# Patient Record
Sex: FEMALE | Race: WHITE | NOT HISPANIC OR LATINO | Employment: FULL TIME | URBAN - METROPOLITAN AREA
[De-identification: names, ages, dates, MRNs, and addresses within clinical notes are randomized per-mention and may not be internally consistent; named-entity substitution may affect disease eponyms.]

---

## 2024-06-05 ENCOUNTER — HOSPITAL ENCOUNTER (EMERGENCY)
Facility: MEDICAL CENTER | Age: 39
End: 2024-06-05
Attending: EMERGENCY MEDICINE
Payer: COMMERCIAL

## 2024-06-05 ENCOUNTER — APPOINTMENT (OUTPATIENT)
Dept: RADIOLOGY | Facility: MEDICAL CENTER | Age: 39
End: 2024-06-05
Attending: EMERGENCY MEDICINE
Payer: COMMERCIAL

## 2024-06-05 ENCOUNTER — OFFICE VISIT (OUTPATIENT)
Dept: URGENT CARE | Facility: PHYSICIAN GROUP | Age: 39
End: 2024-06-05
Payer: COMMERCIAL

## 2024-06-05 VITALS
HEART RATE: 78 BPM | WEIGHT: 177 LBS | TEMPERATURE: 98.2 F | HEIGHT: 63 IN | BODY MASS INDEX: 31.36 KG/M2 | DIASTOLIC BLOOD PRESSURE: 88 MMHG | OXYGEN SATURATION: 98 % | SYSTOLIC BLOOD PRESSURE: 128 MMHG | RESPIRATION RATE: 18 BRPM

## 2024-06-05 VITALS
WEIGHT: 177.03 LBS | RESPIRATION RATE: 17 BRPM | HEART RATE: 70 BPM | TEMPERATURE: 98.1 F | SYSTOLIC BLOOD PRESSURE: 138 MMHG | OXYGEN SATURATION: 98 % | DIASTOLIC BLOOD PRESSURE: 77 MMHG | BODY MASS INDEX: 31.37 KG/M2 | HEIGHT: 63 IN

## 2024-06-05 DIAGNOSIS — R51.9 NONINTRACTABLE HEADACHE, UNSPECIFIED CHRONICITY PATTERN, UNSPECIFIED HEADACHE TYPE: ICD-10-CM

## 2024-06-05 DIAGNOSIS — R51.9 ACUTE INTRACTABLE HEADACHE, UNSPECIFIED HEADACHE TYPE: ICD-10-CM

## 2024-06-05 PROCEDURE — 3074F SYST BP LT 130 MM HG: CPT | Performed by: NURSE PRACTITIONER

## 2024-06-05 PROCEDURE — 700111 HCHG RX REV CODE 636 W/ 250 OVERRIDE (IP): Mod: JZ | Performed by: EMERGENCY MEDICINE

## 2024-06-05 PROCEDURE — 700117 HCHG RX CONTRAST REV CODE 255: Performed by: EMERGENCY MEDICINE

## 2024-06-05 PROCEDURE — 99284 EMERGENCY DEPT VISIT MOD MDM: CPT

## 2024-06-05 PROCEDURE — 99204 OFFICE O/P NEW MOD 45 MIN: CPT | Performed by: NURSE PRACTITIONER

## 2024-06-05 PROCEDURE — 96365 THER/PROPH/DIAG IV INF INIT: CPT

## 2024-06-05 PROCEDURE — 70496 CT ANGIOGRAPHY HEAD: CPT

## 2024-06-05 PROCEDURE — 3079F DIAST BP 80-89 MM HG: CPT | Performed by: NURSE PRACTITIONER

## 2024-06-05 PROCEDURE — 96375 TX/PRO/DX INJ NEW DRUG ADDON: CPT

## 2024-06-05 RX ORDER — ACETAMINOPHEN 10 MG/ML
1000 INJECTION, SOLUTION INTRAVENOUS ONCE
Status: COMPLETED | OUTPATIENT
Start: 2024-06-05 | End: 2024-06-05

## 2024-06-05 RX ORDER — NORETHINDRONE ACETATE AND ETHINYL ESTRADIOL 1.5; .03 MG/1; MG/1
TABLET ORAL
COMMUNITY
Start: 2024-05-02

## 2024-06-05 RX ORDER — METOCLOPRAMIDE HYDROCHLORIDE 5 MG/ML
10 INJECTION INTRAMUSCULAR; INTRAVENOUS ONCE
Status: COMPLETED | OUTPATIENT
Start: 2024-06-05 | End: 2024-06-05

## 2024-06-05 RX ORDER — DIPHENHYDRAMINE HYDROCHLORIDE 50 MG/ML
25 INJECTION INTRAMUSCULAR; INTRAVENOUS ONCE
Status: COMPLETED | OUTPATIENT
Start: 2024-06-05 | End: 2024-06-05

## 2024-06-05 RX ORDER — CETIRIZINE HYDROCHLORIDE 10 MG/1
1 TABLET ORAL DAILY
COMMUNITY

## 2024-06-05 RX ORDER — FLUTICASONE PROPIONATE 50 MCG
1 SPRAY, SUSPENSION (ML) NASAL 2 TIMES DAILY
COMMUNITY

## 2024-06-05 RX ADMIN — IOHEXOL 80 ML: 350 INJECTION, SOLUTION INTRAVENOUS at 15:04

## 2024-06-05 RX ADMIN — ACETAMINOPHEN 1000 MG: 1000 INJECTION INTRAVENOUS at 13:33

## 2024-06-05 RX ADMIN — METOCLOPRAMIDE 10 MG: 5 INJECTION, SOLUTION INTRAMUSCULAR; INTRAVENOUS at 13:30

## 2024-06-05 RX ADMIN — DIPHENHYDRAMINE HYDROCHLORIDE 25 MG: 50 INJECTION, SOLUTION INTRAMUSCULAR; INTRAVENOUS at 13:30

## 2024-06-05 ASSESSMENT — VISUAL ACUITY: OU: 1

## 2024-06-05 ASSESSMENT — ENCOUNTER SYMPTOMS
CHILLS: 0
SHORTNESS OF BREATH: 0
BLURRED VISION: 0
FEVER: 0
WEAKNESS: 0
HEADACHES: 1
CONSTITUTIONAL NEGATIVE: 1
SENSORY CHANGE: 0
VOMITING: 0
CARDIOVASCULAR NEGATIVE: 1
NAUSEA: 0

## 2024-06-05 NOTE — ED TRIAGE NOTES
Cecy Olivares  39 y.o.  female  Chief Complaint   Patient presents with    Headache     Pt reports she had a severe 9/10 headache that started suddenly last night during intercourse at 9PM, pt reports it was sudden onset, lasted about 5 minutes. Has been dull at 5/10 since, to top of head, no nausea or light sensitivity, no neuro deficits. Sent from [place] for imaging.      Patient educated on triage process, to alert staff if any changes in condition.

## 2024-06-05 NOTE — DISCHARGE INSTRUCTIONS
You likely have a postcoital headache, thankfully your CTs were unremarkable.  You can take Excedrin for your headache.  Follow-up with your primary care physician.  Discussed changing your oral birth control as this may be contributing

## 2024-06-05 NOTE — ED NOTES
Pt is discharged. VSS. Paperwork explained to pt by ERP and all questions answered. All belongings sent with pt upon departure. Pt ambulatory with a steady gait out of ED.

## 2024-06-05 NOTE — PROGRESS NOTES
Subjective:     Cecy Olivares is a 39 y.o. female who presents for Headache (Severe and sudden onset of a headache last night. Is better this morning, but is still there. )       Headache  Location:  Back of head    Patient reports at 9 PM last night, she was having sex when she developed sudden onset of severe posterior headache.  Reports it was 9/10.  She was not able to do anything for about 10 minutes due to the severe pain.  Eventually subsided.  However, pain has still been moderate ever since.    Denies previous history of chronic headache or migraines.  Denies head injury.    Reports mother does have a history of a brain tumor presumed to be benign.    Denies fever, chest pain, shortness of breath, vision change, nausea, vomiting, sensory changes, weakness, or other symptoms.    Has not taken medication.    Review of Systems   Constitutional: Negative.  Negative for chills, fever and malaise/fatigue.   Eyes:  Negative for blurred vision.   Respiratory:  Negative for shortness of breath.    Cardiovascular: Negative.  Negative for chest pain.   Gastrointestinal:  Negative for nausea and vomiting.   Neurological:  Positive for headaches. Negative for sensory change and weakness.   All other systems reviewed and are negative.    Refer to HPI for additional details.    During this visit, appropriate PPE was worn, and hand hygiene was performed.    PMH:  has no past medical history on file.    MEDS:   Current Outpatient Medications:     cetirizine (ZYRTEC) 10 MG Tab, Take 1 Tablet by mouth every day., Disp: , Rfl:     fluticasone (FLONASE ALLERGY RELIEF) 50 MCG/ACT nasal spray, Administer 1 Spray into affected nostril(S) 2 times a day., Disp: , Rfl:     TAMIE 1.5/30 1.5-30 MG-MCG Tab, , Disp: , Rfl:     ALLERGIES: No Known Allergies  SURGHX: History reviewed. No pertinent surgical history.  SOCHX:  reports that she has never smoked. She has never used smokeless tobacco. She reports current alcohol use.  "She reports that she does not use drugs.    FH: Per HPI as applicable/pertinent.      Objective:     /88 (BP Location: Left arm, Patient Position: Sitting, BP Cuff Size: Adult)   Pulse 78   Temp 36.8 °C (98.2 °F) (Temporal)   Resp 18   Ht 1.6 m (5' 3\")   Wt 80.3 kg (177 lb)   SpO2 98%   BMI 31.35 kg/m²     Physical Exam  Nursing note reviewed.   Constitutional:       General: She is not in acute distress.     Appearance: She is well-developed. She is not ill-appearing or toxic-appearing.   Eyes:      General: Vision grossly intact.      Extraocular Movements: Extraocular movements intact.      Pupils: Pupils are equal, round, and reactive to light. Pupils are equal.   Cardiovascular:      Rate and Rhythm: Normal rate.   Pulmonary:      Effort: Pulmonary effort is normal. No respiratory distress.   Musculoskeletal:         General: No deformity. Normal range of motion.      Right shoulder: Normal strength.      Left shoulder: Normal strength.      Right hip: No deformity.      Left hip: No deformity.   Skin:     General: Skin is warm and dry.      Coloration: Skin is not pale.   Neurological:      Mental Status: She is alert and oriented to person, place, and time.      GCS: GCS eye subscore is 4. GCS verbal subscore is 5. GCS motor subscore is 6.      Cranial Nerves: No dysarthria or facial asymmetry.      Motor: No weakness.      Gait: Gait normal.   Psychiatric:         Mood and Affect: Mood normal.         Behavior: Behavior normal. Behavior is cooperative.         Thought Content: Thought content normal.         Judgment: Judgment normal.       Assessment/Plan:     1. Acute intractable headache, unspecified headache type    Recommend CTA of the head to further evaluate acute headache.  Unfortunately, patient's insurance excludes outpatient advanced imaging.    Advise further evaluation and treatment in the emergency department at this time. Patient amenable. Patient will go to the [facility]" ER. Report called to [facility]. Patient will go POV now.

## 2024-06-05 NOTE — ED PROVIDER NOTES
ED Provider Note    CHIEF COMPLAINT  Chief Complaint   Patient presents with    Headache     Pt reports she had a severe 9/10 headache that started suddenly last night during intercourse at 9PM, pt reports it was sudden onset, lasted about 5 minutes. Has been dull at 5/10 since, to top of head, no nausea or light sensitivity, no neuro deficits. Sent from [place] for imaging.        EXTERNAL RECORDS REVIEWED  Outpatient Notes urgent care note from today, patient with acute onset headache during sex; given this historical feature patient was sent here for further evaluation    HPI/ROS      Cecy Olivares is a 39 y.o. female who presents with chief complaint of headache.  Patient reports headache began during intercourse.  Patient states that she was having sex, reach climax and immediately developed an acute onset headache which was mostly anterior.  Patient states the pain was very intense and severe for around 5 minutes and then improved significantly.  She remains with a mild nagging headache since that time.  Patient denies any associated neck pain or neck stiffness.  She denies any fevers or chills.  She denies any associated rash.  Patient denies any associated nausea or vomiting.  She denies any vaginal bleeding or abdominal pain.  She has never had a migraine off on climax in the past or headache with sex in the past.  She denies any associated focal weakness or numbness changes in vision or neurologic sequelae otherwise.  Patient is status post tubal ligation but is on birth control for heavy menses started in March.    PAST MEDICAL HISTORY       SURGICAL HISTORY  patient denies any surgical history    FAMILY HISTORY  No family history on file.    SOCIAL HISTORY  Social History     Tobacco Use    Smoking status: Never    Smokeless tobacco: Never   Vaping Use    Vaping status: Never Used   Substance and Sexual Activity    Alcohol use: Yes     Comment: 2-5 nights per week    Drug use: Never    Sexual  "activity: Not on file       CURRENT MEDICATIONS  Home Medications       Reviewed by Sue Sainz R.N. (Registered Nurse) on 06/05/24 at 1139  Med List Status: Not Addressed     Medication Last Dose Status   cetirizine (ZYRTEC) 10 MG Tab  Active   fluticasone (FLONASE ALLERGY RELIEF) 50 MCG/ACT nasal spray  Active   TAMIE 1.5/30 1.5-30 MG-MCG Tab  Active                    ALLERGIES  No Known Allergies    PHYSICAL EXAM  VITAL SIGNS: /88   Pulse 78   Temp 36.6 °C (97.8 °F) (Temporal)   Resp 18   Ht 1.6 m (5' 3\")   Wt 80.3 kg (177 lb 0.5 oz)   LMP 05/04/2024 (Exact Date) Comment: tubal ligation  SpO2 97%   BMI 31.36 kg/m²    Physical Exam  Constitutional:       Appearance: She is well-developed.   HENT:      Head: Normocephalic and atraumatic.   Eyes:      Pupils: Pupils are equal, round, and reactive to light.   Cardiovascular:      Rate and Rhythm: Normal rate and regular rhythm.   Pulmonary:      Effort: Pulmonary effort is normal. No accessory muscle usage or respiratory distress.      Breath sounds: Normal breath sounds.   Abdominal:      General: Bowel sounds are normal.      Palpations: Abdomen is soft. There is no mass.      Tenderness: There is no abdominal tenderness.   Musculoskeletal:         General: Normal range of motion.   Skin:     General: Skin is warm.      Capillary Refill: Capillary refill takes less than 2 seconds.   Neurological:      General: No focal deficit present.      Mental Status: She is alert.      Comments: Distal and proximal strength 5/5 in upper and lower extremities. Normal gait. No dysmetria. No sensation deficits. No visual field deficits. Cranial nerves intact.        Psychiatric:         Mood and Affect: Mood normal. Mood is not anxious.         RADIOLOGY/PROCEDURES   I have independently interpreted the diagnostic imaging associated with this visit and am waiting the final reading from the radiologist.   My preliminary interpretation is as follows: " Unremarkable CTA of the head    Radiologist interpretation:  CT-CTA HEAD WITH & W/O-POST PROCESS   Final Result      CT angiogram of the [place] within normal limits.            COURSE & MEDICAL DECISION MAKING    ASSESSMENT, COURSE AND PLAN  Care Narrative: Well-appearing patient here with likely orgasm related migraine, however given this is patient's first of these she will require CTA to evaluate for possible subarachnoid hemorrhage/anuerismal disease  CTAs thankfully unremarkable.  Patient feeling improved following migraine cocktail.  Home with Excedrin for pain.  Return precautions discussed.          DISPOSITION AND DISCUSSIONS      FINAL DIAGNOSIS  1. Nonintractable headache, unspecified chronicity pattern, unspecified headache type

## 2024-09-11 ENCOUNTER — HOSPITAL ENCOUNTER (EMERGENCY)
Facility: MEDICAL CENTER | Age: 39
End: 2024-09-11
Attending: EMERGENCY MEDICINE
Payer: COMMERCIAL

## 2024-09-11 VITALS
HEART RATE: 80 BPM | BODY MASS INDEX: 32.27 KG/M2 | OXYGEN SATURATION: 99 % | WEIGHT: 182.1 LBS | SYSTOLIC BLOOD PRESSURE: 124 MMHG | TEMPERATURE: 96.5 F | DIASTOLIC BLOOD PRESSURE: 85 MMHG | HEIGHT: 63 IN | RESPIRATION RATE: 18 BRPM

## 2024-09-11 DIAGNOSIS — T14.8XXA ABRASION: ICD-10-CM

## 2024-09-11 PROCEDURE — 99282 EMERGENCY DEPT VISIT SF MDM: CPT

## 2024-09-11 NOTE — DISCHARGE INSTRUCTIONS
Feeling rabies is exceedingly rare, if you are Were to have rabies, this is transmitted through saliva, therefore a scratch would be exceedingly unlikely to cause transmission of rabies.  Keep a close eye on the wound.  Continue to quarantine per the vets recommendations.  If you develop a fever, significant reddening, feel ill otherwise or have any other concerns please return to the emergency department.

## 2024-09-11 NOTE — ED PROVIDER NOTES
ED Provider Note    CHIEF COMPLAINT  Chief Complaint   Patient presents with    Wound Check     Pt was scratched by her cat on her right leg,  pt cat not up to date on vaccines and her car was bit by an animal yesterday and pt concerned that she was exposed to something.  Cat has been checked out by Vet.          HPI/LING      Cecy Olivares is a 39 y.o. female who presents with chief complaint of a painful rash on her leg from where she was scratched by her cat.  Patient's cat was recently bit by an unknown animal.  Cat is not up-to-date on vaccinations and is an outdoor cat.  Unclear what the cat was bit by.  She was seen by the vet, and initially was mildly subdued but has returned to normal behavior.  Cat is being home quarantined, and since on quarantine has been acting normally.  Patient reports that she went in to feed the cat and it tried to escape out the door, trying not to let the cat out, she stepped in front of it and it scratched her on her right leg.  She reports is relatively normal behavior for cat as he is an outdoor cat and typically is very eager to get back outside.      PAST MEDICAL HISTORY       SURGICAL HISTORY   has a past surgical history that includes gyn surgery.    FAMILY HISTORY  History reviewed. No pertinent family history.    SOCIAL HISTORY  Social History     Tobacco Use    Smoking status: Never    Smokeless tobacco: Never   Vaping Use    Vaping status: Never Used   Substance and Sexual Activity    Alcohol use: Yes     Comment: 2-5 nights per week    Drug use: Never    Sexual activity: Not on file       CURRENT MEDICATIONS  Home Medications       Reviewed by Cat Dial R.N. (Registered Nurse) on 09/11/24 at 0800  Med List Status: Partial     Medication Last Dose Status   cetirizine (ZYRTEC) 10 MG Tab  Active   fluticasone (FLONASE ALLERGY RELIEF) 50 MCG/ACT nasal spray  Active   TAMIE 1.5/30 1.5-30 MG-MCG Tab  Active                    ALLERGIES  No Known  "Allergies    PHYSICAL EXAM  VITAL SIGNS: /81   Pulse 83   Temp 35.8 °C (96.5 °F) (Temporal)   Resp 18   Ht 1.6 m (5' 3\")   Wt 82.6 kg (182 lb 1.6 oz)   LMP 09/01/2024   SpO2 97%   BMI 32.26 kg/m²    Physical Exam  HENT:      Head: Normocephalic and atraumatic.   Pulmonary:      Effort: Pulmonary effort is normal.   Skin:     Comments: There is a small very superficial abrasion to patient's right leg, approximately 2 cm.  No surrounding erythema or induration.   Neurological:      General: No focal deficit present.      Mental Status: She is alert.   Psychiatric:         Mood and Affect: Mood normal.           COURSE & MEDICAL DECISION MAKING    ASSESSMENT, COURSE AND PLAN  Care Narrative: Patient here after cat scratch.  She is very well-appearing.  There is no evidence of infection here.  The depth of the injury is minimal, I believe cellulitis from this injury would be exceedingly unlikely.  Patient is very well-appearing at this point.  My suspicion of rabies is low here.  1 the cat is acting normally per patient, 2 feline rabies is exceedingly rare though possible, and 3 the mechanism of injury is an incredibly low risk mechanism given the patient was scratched and rabies is a pathology carried through saliva; consensus is to delay postexposure prophylaxis in animals that can be quarantined for 10 days. Animal is in quarantine and at this point is not displaying any evidence of significant illness, patient understands that if the feeling starts developing signs of significant illness that she should return to the emergency department which we discussed            DISPOSITION AND DISCUSSIONS      Escalation of care considered, and ultimately not performed: Postexposure rabies prophylaxis was deferred, this was made using shared decision-making, we discussed benefits and risks of this approach.  We decided to defer treatment as animal is under quarantine and acting normally at this " point.        FINAL DIAGNOSIS  1. Abrasion

## 2024-09-11 NOTE — ED TRIAGE NOTES
Pt ambulated to triage with   Chief Complaint   Patient presents with    Wound Check     Pt was scratched by her cat on her right leg,  pt cat not up to date on vaccines and her car was bit by an animal yesterday and pt concerned that she was exposed to something.  Cat has been checked out by Vet.      Pt Informed regarding triage process and verbalized understanding to inform triage tech or RN for any changes in condition. Placed in lobby.